# Patient Record
Sex: FEMALE | Race: WHITE | NOT HISPANIC OR LATINO
[De-identification: names, ages, dates, MRNs, and addresses within clinical notes are randomized per-mention and may not be internally consistent; named-entity substitution may affect disease eponyms.]

---

## 2017-05-08 PROBLEM — Z00.00 ENCOUNTER FOR PREVENTIVE HEALTH EXAMINATION: Status: ACTIVE | Noted: 2017-05-08

## 2018-03-27 ENCOUNTER — APPOINTMENT (OUTPATIENT)
Dept: GASTROENTEROLOGY | Facility: CLINIC | Age: 63
End: 2018-03-27
Payer: COMMERCIAL

## 2018-03-27 VITALS
HEART RATE: 54 BPM | DIASTOLIC BLOOD PRESSURE: 70 MMHG | BODY MASS INDEX: 22.84 KG/M2 | HEIGHT: 61 IN | WEIGHT: 121 LBS | OXYGEN SATURATION: 90 % | SYSTOLIC BLOOD PRESSURE: 108 MMHG | TEMPERATURE: 97.9 F

## 2018-03-27 DIAGNOSIS — Z83.79 FAMILY HISTORY OF OTHER DISEASES OF THE DIGESTIVE SYSTEM: ICD-10-CM

## 2018-03-27 DIAGNOSIS — Z80.3 FAMILY HISTORY OF MALIGNANT NEOPLASM OF BREAST: ICD-10-CM

## 2018-03-27 DIAGNOSIS — Z80.1 FAMILY HISTORY OF MALIGNANT NEOPLASM OF TRACHEA, BRONCHUS AND LUNG: ICD-10-CM

## 2018-03-27 DIAGNOSIS — Z78.9 OTHER SPECIFIED HEALTH STATUS: ICD-10-CM

## 2018-03-27 DIAGNOSIS — Z12.11 ENCOUNTER FOR SCREENING FOR MALIGNANT NEOPLASM OF COLON: ICD-10-CM

## 2018-03-27 PROCEDURE — 99202 OFFICE O/P NEW SF 15 MIN: CPT

## 2018-03-27 RX ORDER — GINGER ROOT/GINGER ROOT EXT 262.5 MG
CAPSULE ORAL
Refills: 0 | Status: ACTIVE | COMMUNITY

## 2018-03-27 RX ORDER — MV-MIN/FOLIC/VIT K/LYCOP/COQ10 200-100MCG
CAPSULE ORAL
Refills: 0 | Status: ACTIVE | COMMUNITY

## 2018-04-17 ENCOUNTER — APPOINTMENT (OUTPATIENT)
Dept: GASTROENTEROLOGY | Facility: AMBULATORY MEDICAL SERVICES | Age: 63
End: 2018-04-17
Payer: COMMERCIAL

## 2018-04-17 PROCEDURE — 45380 COLONOSCOPY AND BIOPSY: CPT

## 2019-01-29 ENCOUNTER — OTHER (OUTPATIENT)
Age: 64
End: 2019-01-29

## 2020-12-16 PROBLEM — Z12.11 ENCOUNTER FOR SCREENING FOR MALIGNANT NEOPLASM OF COLON: Status: RESOLVED | Noted: 2018-03-27 | Resolved: 2020-12-16

## 2022-02-19 ENCOUNTER — TRANSCRIPTION ENCOUNTER (OUTPATIENT)
Age: 67
End: 2022-02-19

## 2024-02-28 ENCOUNTER — APPOINTMENT (OUTPATIENT)
Dept: GASTROENTEROLOGY | Facility: CLINIC | Age: 69
End: 2024-02-28
Payer: MEDICARE

## 2024-02-28 VITALS
HEART RATE: 56 BPM | BODY MASS INDEX: 22.28 KG/M2 | HEIGHT: 61 IN | SYSTOLIC BLOOD PRESSURE: 110 MMHG | TEMPERATURE: 96.5 F | WEIGHT: 118 LBS | DIASTOLIC BLOOD PRESSURE: 68 MMHG | OXYGEN SATURATION: 99 %

## 2024-02-28 DIAGNOSIS — Z12.11 ENCOUNTER FOR SCREENING FOR MALIGNANT NEOPLASM OF COLON: ICD-10-CM

## 2024-02-28 DIAGNOSIS — K64.8 OTHER HEMORRHOIDS: ICD-10-CM

## 2024-02-28 DIAGNOSIS — K63.3 ULCER OF INTESTINE: ICD-10-CM

## 2024-02-28 DIAGNOSIS — K64.4 RESIDUAL HEMORRHOIDAL SKIN TAGS: ICD-10-CM

## 2024-02-28 PROCEDURE — 99203 OFFICE O/P NEW LOW 30 MIN: CPT

## 2024-02-28 RX ORDER — OMEGA-3S/DHA/EPA/FISH OIL 1000-1400
CAPSULE,DELAYED RELEASE (ENTERIC COATED) ORAL
Refills: 0 | Status: ACTIVE | COMMUNITY

## 2024-02-28 RX ORDER — BERBERINE CHLOR/SEAWEED/CHROM 500-250 MG
CAPSULE ORAL
Refills: 0 | Status: DISCONTINUED | COMMUNITY
End: 2024-02-28

## 2024-02-28 RX ORDER — PSYLLIUM HUSK 0.4 G
CAPSULE ORAL
Refills: 0 | Status: DISCONTINUED | COMMUNITY
End: 2024-02-28

## 2024-02-28 RX ORDER — CHOLECALCIFEROL (VITAMIN D3) 25 MCG
TABLET ORAL
Refills: 0 | Status: ACTIVE | COMMUNITY

## 2024-02-28 RX ORDER — SODIUM PICOSULFATE, MAGNESIUM OXIDE, AND ANHYDROUS CITRIC ACID 12; 3.5; 1 G/175ML; G/175ML; MG/175ML
10-3.5-12 MG-GM LIQUID ORAL
Qty: 2 | Refills: 0 | Status: ACTIVE | COMMUNITY
Start: 2024-02-28 | End: 1900-01-01

## 2024-02-28 RX ORDER — SODIUM SULFATE, POTASSIUM SULFATE, MAGNESIUM SULFATE 17.5; 3.13; 1.6 G/ML; G/ML; G/ML
17.5-3.13-1.6 SOLUTION, CONCENTRATE ORAL
Qty: 1 | Refills: 0 | Status: DISCONTINUED | COMMUNITY
Start: 2018-03-27 | End: 2024-02-28

## 2024-02-28 RX ORDER — PSYLLIUM SEED
PACKET (EA) ORAL
Refills: 0 | Status: ACTIVE | COMMUNITY

## 2024-02-28 NOTE — ASSESSMENT
[FreeTextEntry1] : Patient in need of a screening colonoscopy.  She has a history of a terminal ileal ulcer and a family history of a sister with Crohn's disease.  A colonoscopy has been scheduled. The risks, benefits, alternatives, and limitations of the procedure, including the possibility of missed lesions, were explained.   Plan from 3/27/2018 - Patient had an uncle with colon cancer who presents for colonoscopy.    A colonoscopy has been scheduled. The risks, benefits, alternatives, and limitations of the procedure, including the possibility of missed lesions, were explained.

## 2024-02-28 NOTE — CONSULT LETTER
[FreeTextEntry1] : Dear Dr. Xavier Varner,  I had the pleasure of seeing your patient GALA JETER in the office today.  My office note is attached. PLEASE READ THE "ASSESSMENT" SECTION OF THE NOTE TO SEE MY IMPRESSION AND PLAN.  Thank you very much for allowing me to participate in the care of your patient.  Sincerely,  Wilton Nash M.D., FACG, FACP Director, Celiac Program at [facility]  of Medicine, [employer] at [facility] Adjunct  of Medicine, [facility] of Medicine Practice Director, [facility] - Gastroenterology at [address] - [address] [place] [address] Tel: [phone] Email: [email or web address]   The attached note has been created using a voice recognition system ([email or web address]).  There may be some misspellings and typos.  Please call my office if you have any issues or questions.

## 2024-02-28 NOTE — PHYSICAL EXAM
[General Appearance - Alert] : alert [General Appearance - In No Acute Distress] : in no acute distress [Neck Appearance] : the appearance of the neck was normal [Jugular Venous Distention Increased] : there was no jugular-venous distention [Neck Cervical Mass (___cm)] : no neck mass was observed [Thyroid Diffuse Enlargement] : the thyroid was not enlarged [Thyroid Nodule] : there were no palpable thyroid nodules [Auscultation Breath Sounds / Voice Sounds] : lungs were clear to auscultation bilaterally [Heart Rate And Rhythm] : heart rate was normal and rhythm regular [Heart Sounds] : normal S1 and S2 [Murmurs] : no murmurs [Heart Sounds Gallop] : no gallops [Heart Sounds Pericardial Friction Rub] : no pericardial rub [Bowel Sounds] : normal bowel sounds [Edema] : there was no peripheral edema [Abdomen Soft] : soft [Abdomen Tenderness] : non-tender [] : no hepato-splenomegaly [Abdomen Mass (___ Cm)] : no abdominal mass palpated [No CVA Tenderness] : no ~M costovertebral angle tenderness [No Spinal Tenderness] : no spinal tenderness [Oriented To Time, Place, And Person] : oriented to person, place, and time [Impaired Insight] : insight and judgment were intact [Affect] : the affect was normal

## 2024-02-28 NOTE — HISTORY OF PRESENT ILLNESS
[FreeTextEntry1] : The patient is a 68-year-old woman who presents for colonoscopy.  She was last seen at colonoscopy on April 17, 2018 at which time she had a small terminal ileal ulcer with negative biopsies.  A small hyperplastic polyp was removed from the rectum and the patient also has internal and external hemorrhoids which are asymptomatic.  She feels well denying abdominal pain, heartburn, dysphagia, nausea, vomiting.  She takes Metamucil and fiber Gummies and has 1 solid bowel movement a day without melena or bright red blood per rectum.  The patient's weight is stable.  The patient has not been admitted to the hospital in the past year and denies any cardiac issues.  Of note, the patient's sister has Crohn's disease.   Note from 3/27/2018 - The patient is a 62-year-old woman who presents for colonoscopy given the fact that she has an uncle with colon cancer. She denies abdominal pain, heartburn, or dysphagia. She has one solid bowel movement a day without diarrhea, constipation, melena, or bright blood per rectum. Her weight is stable. She is on maintenance at [employer]. The patient has not been hospitalized in the past year and denies any cardiac issues.

## 2024-06-12 ENCOUNTER — APPOINTMENT (OUTPATIENT)
Dept: GASTROENTEROLOGY | Facility: AMBULATORY MEDICAL SERVICES | Age: 69
End: 2024-06-12
Payer: MEDICARE

## 2024-06-12 PROCEDURE — 45380 COLONOSCOPY AND BIOPSY: CPT | Mod: PT
